# Patient Record
Sex: MALE | Race: ASIAN
[De-identification: names, ages, dates, MRNs, and addresses within clinical notes are randomized per-mention and may not be internally consistent; named-entity substitution may affect disease eponyms.]

---

## 2018-02-19 ENCOUNTER — HOSPITAL ENCOUNTER (EMERGENCY)
Dept: HOSPITAL 80 - FED | Age: 18
Discharge: HOME | End: 2018-02-19
Payer: COMMERCIAL

## 2018-02-19 VITALS
SYSTOLIC BLOOD PRESSURE: 125 MMHG | OXYGEN SATURATION: 98 % | RESPIRATION RATE: 18 BRPM | DIASTOLIC BLOOD PRESSURE: 66 MMHG | TEMPERATURE: 98.4 F | HEART RATE: 79 BPM

## 2018-02-19 DIAGNOSIS — K64.5: Primary | ICD-10-CM

## 2018-02-19 PROCEDURE — 06BY0ZC EXCISION OF HEMORRHOIDAL PLEXUS, OPEN APPROACH: ICD-10-PCS

## 2018-02-19 NOTE — EDPHY
H & P


Time Seen by Provider: 02/19/18 21:07


HPI/ROS: 





CHIEF COMPLAINT:  'I think I have hemorrhoid"





HISTORY OF PRESENT ILLNESS:  17-year-old immunocompetent male with prior 

history of hemorrhoid, complaining of 3 days of acute hemorrhoids, pain with 

defecation.  No rectal foreign body insertion.  No fever no chills.  No nausea 

or vomiting.








Assistance of language line  used





************


PHYSICAL EXAM





(Prior to examination, patient consented to physical exam, hands were washed 

and my usual and customary physical exam procedures followed)


1) GENERAL: Well-developed, well-nourished, alert and oriented.  Appears to be 

in no acute distress.


2) HEAD: Normocephalic


3) HEENT: sclera anicteric   


4) LUNGS: Breathing comfortably.   


[5) :  Patient has a thrombosed hemorrhoid at the 2 o'clock position.  No 

signs of abscess or cellulitis


Smoking Status: Never smoked


Constitutional: 





 Initial Vital Signs











Temperature (C)  36.9 C   02/19/18 20:53


 


Heart Rate  79   02/19/18 20:53


 


Respiratory Rate  18   02/19/18 20:53


 


Blood Pressure  125/66 H  02/19/18 20:53


 


O2 Sat (%)  98   02/19/18 20:53








 











O2 Delivery Mode               Room Air














Allergies/Adverse Reactions: 


 





Penicillins Allergy (Verified 02/19/18 20:52)


 








Home Medications: 














 Medication  Instructions  Recorded


 


Hydrocortisone Acetate [Anucort-Hc] 25 mg RC Q6 #15 supp.rect 02/19/18














MDM/Departure





- MDM


Procedures: 





Procedure:  Excision of thrombosed hemorrhoid


Indication:  Thrombosed hemorrhoid at the 2 o'clock position


With assistance of  the indications risks benefits discussed with 

patient and he consented.  The area is prepped draped normal sterile fashion, 

anesthetized with 1% lidocaine with epinephrine.  An elliptoid incision was 

made with manual removal in aspiration of thrombus.  Area is irrigated.  

Patient tolerated procedure well.


ED Course/Re-evaluation: 





I have recommended patient follow up with General surgery as will more than 

likely necessitate further evaluation.  I have given the name of Dr. Salvador Salinas for follow-up.  Care of patient under supervision of  secondary 

supervising physician Dr Tipton . 





- Depart


Disposition: Home, Routine, Self-Care


Clinical Impression: 


 Thrombosed hemorrhoids





Condition: Good


Instructions:  Hemorrhoids (ED), Sitz Bath (DC)


Prescriptions: 


Hydrocortisone Acetate [Anucort-Hc] 25 mg RC Q6 #15 supp.rect


Referrals: 


Salvador Salinas MD [Medical Doctor] - 1-2 days without fail

## 2018-02-19 NOTE — EDPHY
H & P


Stated Complaint: "hemmorhoids"





- Medical/Surgical History


Hx Asthma: No


Hx Chronic Respiratory Disease: No


Hx Diabetes: No


Hx Cardiac Disease: No


Hx Renal Disease: No


Hx Cirrhosis: No


Hx Alcoholism: No


Hx HIV/AIDS: No


Hx Splenectomy or Spleen Trauma: No


Other PMH: denies





- Social History


Smoking Status: Never smoked


Constitutional: 





 Initial Vital Signs











Temperature (C)  36.9 C   02/19/18 20:53


 


Heart Rate  79   02/19/18 20:53


 


Respiratory Rate  18   02/19/18 20:53


 


Blood Pressure  125/66 H  02/19/18 20:53


 


O2 Sat (%)  98   02/19/18 20:53








 











O2 Delivery Mode               Room Air














Allergies/Adverse Reactions: 


 





Penicillins Allergy (Verified 02/19/18 20:52)


 








Home Medications: 














 Medication  Instructions  Recorded


 


NK [No Known Home Meds]  02/19/18














Medical Decision Making


ED Course/Re-evaluation: 





CHIEF COMPLAINT:  





HISTORY OF PRESENT ILLNESS:  must have 4 elements:  Location, Quality, Severity

, Duration, Timing, Context, Modifying Factors, Associated Signs and Symptoms





REVIEW OF SYSTEMS:  





A 10 point review of systems was performed and is negative with the exception 

of the elements mentioned in the history of present illness.





PHYSICAL EXAM:  





HR, BP, O2 Sat, RR.  Temp noted


General Appearance:  Alert, well hydrated, appropriate, and non-toxic appearing.


Head:  Atraumatic without scalp tenderness or obvious injury


Eyes:  Pupils equal, round, reactive to light and accommodation, EOMI, no trauma

, no injection.


Ears:  Clear bilaterally, no perforation, normal landmarks


Nose:  Atraumatic, no rhinorrhea, clear.


Throat:  There is no erythema or exudates, no lesions, normal tonsils, mucus 

membranes moist.


Neck:  Supple, 2+ carotid upstroke, nontender, no lymphadenopathy.


Respiratory:  No retractions, no distress, no wheezes, and no accessory muscle 

use.  Lungs are clear to auscultation bilaterally.


Cardiovascular:  Regular rate and rhythm, no murmurs, rubs, or gallops. 

Bilateral carotid, radial, dorsalis pedis, and posterior tibial pulses intact. 

Good capillary refill all extremities.


Gastrointestinal:  Abdomen is soft, nontender, non-distended, no masses, no 

rebound, no guarding, no peritoneal signs.


Musculoskeletal:  Normal active ROM of all extremities, atraumatic.


Neurological:  Alert, appropriate, and interactive.  The patient has normal 

DTRs and non-focal cranial nerves, motor, sensory, and cerebellar exam.


Skin:  No rashes, good turgor, no nodules on palpation.





Past medical history:


Past surgical history:


Family history:


Social history:





DIAGNOSTICS/PROCEDURES/CRITICAL CARE TIME:  








DIFFERENTIAL DIAGNOSIS:   





MEDICAL DECISION MAKING:  





Departure





- Departure


Referrals: 


NONE *PRIMARY CARE P,. [Primary Care Provider] - As per Instructions